# Patient Record
Sex: MALE | URBAN - METROPOLITAN AREA
[De-identification: names, ages, dates, MRNs, and addresses within clinical notes are randomized per-mention and may not be internally consistent; named-entity substitution may affect disease eponyms.]

---

## 2017-09-08 PROBLEM — F60.7 DEPENDENT PERSONALITY DISORDER (HCC): Chronic | Status: ACTIVE | Noted: 2017-09-08

## 2017-09-08 PROBLEM — F33.2 SEVERE EPISODE OF RECURRENT MAJOR DEPRESSIVE DISORDER, WITHOUT PSYCHOTIC FEATURES (HCC): Status: ACTIVE | Noted: 2017-09-08

## 2017-09-09 PROBLEM — F20.0 PARANOID SCHIZOPHRENIA (HCC): Chronic | Status: ACTIVE | Noted: 2017-09-09

## 2017-09-09 PROBLEM — F20.0 PARANOID SCHIZOPHRENIA (HCC): Chronic | Status: RESOLVED | Noted: 2017-09-09 | Resolved: 2017-09-09

## 2017-09-18 ENCOUNTER — OFFICE VISIT (OUTPATIENT)
Dept: INTERNAL MEDICINE CLINIC | Age: 52
End: 2017-09-18

## 2017-09-18 VITALS
WEIGHT: 147 LBS | HEART RATE: 84 BPM | BODY MASS INDEX: 21.77 KG/M2 | SYSTOLIC BLOOD PRESSURE: 152 MMHG | DIASTOLIC BLOOD PRESSURE: 86 MMHG | HEIGHT: 69 IN

## 2017-09-18 DIAGNOSIS — Z98.1 HISTORY OF LUMBAR FUSION: ICD-10-CM

## 2017-09-18 DIAGNOSIS — Z86.19 HISTORY OF HEPATITIS C: ICD-10-CM

## 2017-09-18 DIAGNOSIS — I10 ESSENTIAL HYPERTENSION: ICD-10-CM

## 2017-09-18 DIAGNOSIS — F33.2 SEVERE EPISODE OF RECURRENT MAJOR DEPRESSIVE DISORDER, WITHOUT PSYCHOTIC FEATURES (HCC): ICD-10-CM

## 2017-09-18 DIAGNOSIS — M54.42 CHRONIC BILATERAL LOW BACK PAIN WITH LEFT-SIDED SCIATICA: ICD-10-CM

## 2017-09-18 DIAGNOSIS — Z91.51 HISTORY OF SUICIDE ATTEMPT: ICD-10-CM

## 2017-09-18 DIAGNOSIS — F19.10 DRUG ABUSE (HCC): ICD-10-CM

## 2017-09-18 DIAGNOSIS — Z12.11 COLON CANCER SCREENING: ICD-10-CM

## 2017-09-18 DIAGNOSIS — F60.7 DEPENDENT PERSONALITY DISORDER (HCC): Chronic | ICD-10-CM

## 2017-09-18 DIAGNOSIS — Z76.89 ENCOUNTER TO ESTABLISH CARE: Primary | ICD-10-CM

## 2017-09-18 DIAGNOSIS — G89.29 CHRONIC BILATERAL LOW BACK PAIN WITH LEFT-SIDED SCIATICA: ICD-10-CM

## 2017-09-18 DIAGNOSIS — S22.079A CLOSED FRACTURE OF TENTH THORACIC VERTEBRA, UNSPECIFIED FRACTURE MORPHOLOGY, INITIAL ENCOUNTER (HCC): ICD-10-CM

## 2017-09-18 PROCEDURE — 99204 OFFICE O/P NEW MOD 45 MIN: CPT | Performed by: NURSE PRACTITIONER

## 2017-09-18 RX ORDER — ALENDRONATE SODIUM 70 MG/1
70 TABLET ORAL
COMMUNITY
End: 2018-03-05

## 2017-09-18 RX ORDER — HYDROCHLOROTHIAZIDE 12.5 MG/1
12.5 TABLET ORAL DAILY
Qty: 30 TABLET | Refills: 12 | Status: SHIPPED | OUTPATIENT
Start: 2017-09-18 | End: 2017-09-18 | Stop reason: SDUPTHER

## 2017-09-19 PROBLEM — F19.10 DRUG ABUSE (HCC): Status: ACTIVE | Noted: 2017-09-19

## 2017-09-19 PROBLEM — Z98.1 HISTORY OF LUMBAR FUSION: Status: ACTIVE | Noted: 2017-09-19

## 2017-09-19 PROBLEM — S22.079A CLOSED FRACTURE OF TENTH THORACIC VERTEBRA (HCC): Status: ACTIVE | Noted: 2017-09-19

## 2017-09-19 PROBLEM — Z86.19 HISTORY OF HEPATITIS C: Status: ACTIVE | Noted: 2017-09-19

## 2017-09-19 PROBLEM — F20.0 PARANOID SCHIZOPHRENIA (HCC): Status: ACTIVE | Noted: 2017-09-19

## 2017-09-19 PROBLEM — G89.29 CHRONIC BILATERAL LOW BACK PAIN WITH LEFT-SIDED SCIATICA: Status: ACTIVE | Noted: 2017-09-19

## 2017-09-19 PROBLEM — I10 ESSENTIAL HYPERTENSION: Status: ACTIVE | Noted: 2017-09-19

## 2017-09-19 PROBLEM — Z91.51 HISTORY OF SUICIDE ATTEMPT: Status: ACTIVE | Noted: 2017-09-19

## 2017-09-19 PROBLEM — M54.42 CHRONIC BILATERAL LOW BACK PAIN WITH LEFT-SIDED SCIATICA: Status: ACTIVE | Noted: 2017-09-19

## 2017-09-19 RX ORDER — HYDROCHLOROTHIAZIDE 12.5 MG/1
12.5 TABLET ORAL DAILY
Qty: 30 TABLET | Refills: 5 | Status: SHIPPED | OUTPATIENT
Start: 2017-09-19 | End: 2017-09-21 | Stop reason: SDUPTHER

## 2017-09-19 ASSESSMENT — ENCOUNTER SYMPTOMS
SHORTNESS OF BREATH: 0
BACK PAIN: 1
GASTROINTESTINAL NEGATIVE: 1

## 2017-09-21 DIAGNOSIS — I10 ESSENTIAL HYPERTENSION: ICD-10-CM

## 2017-09-21 RX ORDER — HYDROCHLOROTHIAZIDE 12.5 MG/1
TABLET ORAL
Qty: 90 TABLET | Refills: 0 | Status: SHIPPED | OUTPATIENT
Start: 2017-09-21 | End: 2018-02-01 | Stop reason: SDUPTHER

## 2017-11-10 ENCOUNTER — OFFICE VISIT (OUTPATIENT)
Dept: PSYCHIATRY | Age: 52
End: 2017-11-10

## 2017-11-10 VITALS
HEIGHT: 69 IN | WEIGHT: 149 LBS | DIASTOLIC BLOOD PRESSURE: 90 MMHG | BODY MASS INDEX: 22.07 KG/M2 | SYSTOLIC BLOOD PRESSURE: 144 MMHG | HEART RATE: 72 BPM

## 2017-11-10 DIAGNOSIS — F33.1 MODERATE EPISODE OF RECURRENT MAJOR DEPRESSIVE DISORDER (HCC): Primary | ICD-10-CM

## 2017-11-10 PROCEDURE — 99204 OFFICE O/P NEW MOD 45 MIN: CPT | Performed by: PSYCHIATRY & NEUROLOGY

## 2017-11-10 RX ORDER — TRAZODONE HYDROCHLORIDE 150 MG/1
150 TABLET ORAL NIGHTLY
Qty: 30 TABLET | Refills: 1 | Status: SHIPPED | OUTPATIENT
Start: 2017-11-10 | End: 2017-12-04 | Stop reason: DRUGHIGH

## 2017-11-10 ASSESSMENT — PATIENT HEALTH QUESTIONNAIRE - PHQ9
5. POOR APPETITE OR OVEREATING: 2
8. MOVING OR SPEAKING SO SLOWLY THAT OTHER PEOPLE COULD HAVE NOTICED. OR THE OPPOSITE, BEING SO FIGETY OR RESTLESS THAT YOU HAVE BEEN MOVING AROUND A LOT MORE THAN USUAL: 2
10. IF YOU CHECKED OFF ANY PROBLEMS, HOW DIFFICULT HAVE THESE PROBLEMS MADE IT FOR YOU TO DO YOUR WORK, TAKE CARE OF THINGS AT HOME, OR GET ALONG WITH OTHER PEOPLE: 3
4. FEELING TIRED OR HAVING LITTLE ENERGY: 3
7. TROUBLE CONCENTRATING ON THINGS, SUCH AS READING THE NEWSPAPER OR WATCHING TELEVISION: 3
2. FEELING DOWN, DEPRESSED OR HOPELESS: 3
3. TROUBLE FALLING OR STAYING ASLEEP: 3
9. THOUGHTS THAT YOU WOULD BE BETTER OFF DEAD, OR OF HURTING YOURSELF: 2
SUM OF ALL RESPONSES TO PHQ QUESTIONS 1-9: 24
6. FEELING BAD ABOUT YOURSELF - OR THAT YOU ARE A FAILURE OR HAVE LET YOURSELF OR YOUR FAMILY DOWN: 3
SUM OF ALL RESPONSES TO PHQ9 QUESTIONS 1 & 2: 6
1. LITTLE INTEREST OR PLEASURE IN DOING THINGS: 3

## 2017-11-10 ASSESSMENT — ANXIETY QUESTIONNAIRES
6. BECOMING EASILY ANNOYED OR IRRITABLE: 3-NEARLY EVERY DAY
GAD7 TOTAL SCORE: 20
7. FEELING AFRAID AS IF SOMETHING AWFUL MIGHT HAPPEN: 3-NEARLY EVERY DAY
5. BEING SO RESTLESS THAT IT IS HARD TO SIT STILL: 2-OVER HALF THE DAYS
4. TROUBLE RELAXING: 3-NEARLY EVERY DAY
1. FEELING NERVOUS, ANXIOUS, OR ON EDGE: 3-NEARLY EVERY DAY
2. NOT BEING ABLE TO STOP OR CONTROL WORRYING: 3-NEARLY EVERY DAY
3. WORRYING TOO MUCH ABOUT DIFFERENT THINGS: 3-NEARLY EVERY DAY
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
## 2017-11-20 PROBLEM — F33.1 MODERATE EPISODE OF RECURRENT MAJOR DEPRESSIVE DISORDER (HCC): Status: ACTIVE | Noted: 2017-09-08

## 2017-12-04 ENCOUNTER — TELEPHONE (OUTPATIENT)
Dept: INTERNAL MEDICINE CLINIC | Age: 52
End: 2017-12-04

## 2017-12-04 RX ORDER — TRAZODONE HYDROCHLORIDE 300 MG/1
300 TABLET ORAL NIGHTLY
Qty: 30 TABLET | Refills: 0 | Status: SHIPPED | OUTPATIENT
Start: 2017-12-04 | End: 2018-01-19 | Stop reason: SDUPTHER

## 2017-12-08 ENCOUNTER — OFFICE VISIT (OUTPATIENT)
Dept: PSYCHIATRY | Age: 52
End: 2017-12-08

## 2017-12-08 VITALS
BODY MASS INDEX: 22.07 KG/M2 | WEIGHT: 149 LBS | HEIGHT: 69 IN | HEART RATE: 76 BPM | DIASTOLIC BLOOD PRESSURE: 88 MMHG | SYSTOLIC BLOOD PRESSURE: 138 MMHG

## 2017-12-08 DIAGNOSIS — F33.1 MODERATE EPISODE OF RECURRENT MAJOR DEPRESSIVE DISORDER (HCC): Primary | ICD-10-CM

## 2017-12-08 DIAGNOSIS — F17.200 NICOTINE USE DISORDER: ICD-10-CM

## 2017-12-08 PROCEDURE — 99214 OFFICE O/P EST MOD 30 MIN: CPT | Performed by: PSYCHIATRY & NEUROLOGY

## 2017-12-08 RX ORDER — NICOTINE 21 MG/24HR
1 PATCH, TRANSDERMAL 24 HOURS TRANSDERMAL EVERY 24 HOURS
Qty: 30 PATCH | Refills: 1 | Status: SHIPPED | OUTPATIENT
Start: 2017-12-08 | End: 2018-01-19 | Stop reason: SDUPTHER

## 2017-12-08 NOTE — PATIENT INSTRUCTIONS
[facility] [address] [facility], [facility] [address]  [phone]

## 2017-12-12 PROBLEM — F17.200 NICOTINE USE DISORDER: Status: ACTIVE | Noted: 2017-12-12
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
## 2018-01-19 ENCOUNTER — TELEPHONE (OUTPATIENT)
Dept: RHEUMATOLOGY | Age: 53
End: 2018-01-19

## 2018-01-19 ENCOUNTER — OFFICE VISIT (OUTPATIENT)
Dept: PSYCHIATRY | Age: 53
End: 2018-01-19

## 2018-01-19 VITALS
BODY MASS INDEX: 22.51 KG/M2 | SYSTOLIC BLOOD PRESSURE: 138 MMHG | WEIGHT: 152.4 LBS | DIASTOLIC BLOOD PRESSURE: 92 MMHG | HEART RATE: 68 BPM

## 2018-01-19 DIAGNOSIS — F33.2 SEVERE EPISODE OF RECURRENT MAJOR DEPRESSIVE DISORDER, WITHOUT PSYCHOTIC FEATURES (HCC): Primary | ICD-10-CM

## 2018-01-19 DIAGNOSIS — F19.10 DRUG ABUSE (HCC): ICD-10-CM

## 2018-01-19 DIAGNOSIS — F17.200 NICOTINE USE DISORDER: ICD-10-CM

## 2018-01-19 PROCEDURE — 99215 OFFICE O/P EST HI 40 MIN: CPT | Performed by: PSYCHIATRY & NEUROLOGY

## 2018-01-19 RX ORDER — NICOTINE 21 MG/24HR
1 PATCH, TRANSDERMAL 24 HOURS TRANSDERMAL EVERY 24 HOURS
Qty: 30 PATCH | Refills: 1 | Status: SHIPPED | OUTPATIENT
Start: 2018-01-19 | End: 2018-03-05

## 2018-01-19 RX ORDER — TRAZODONE HYDROCHLORIDE 300 MG/1
300 TABLET ORAL NIGHTLY
Qty: 30 TABLET | Refills: 1 | Status: SHIPPED | OUTPATIENT
Start: 2018-01-19 | End: 2018-02-21 | Stop reason: ALTCHOICE

## 2018-01-19 NOTE — PROGRESS NOTES
PSYCHIATRY PROGRESS NOTE    Violeta Banuleos  1965 01/19/18  Face to Face time: 45 min  PCP: Radha Lawton CNP    A: 46 yo M with depression and anxiety over the last 2 yrs. He has clear evidence of substance use disorder although he has never admitted this is an issue. He has primarily been interested in benzodiazepines and narcotic pain medication but seems to be slowly understanding maybe this won't be happening and he needs to engage in other more effective treatments. He is using tramadol not prescribed to him. He is tolerating the trazodone, seems willing to try an SSRI again.      1. Major depressive disorder, moderate  2. 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 Nicotine use disorder  4. Cannabis abuse  5. Vertebral fracture, hx of lumbar fusion, HTN, hx of Hep C, chronic back pain     P:   1. Continue trazodone 300mg qhs, start sertraline 50mg daily  2. Will resume nicotine 21mg/day patch and continue efforts to quit smoking. Counseled on smoking cessation. 3. He seems to be more established with services at [facility] than he has previously expressed. He signed a VENKATESH so I will attempt to contact his   Sanjay Rivera) there. 4. Discussed safety planning including voluntary hospitalization, emergency room and other emergency services through [facility], partial hospitalization programs. 5. Suggested he f/u with PCP regarding reflux issues. I spent >50% of the appointment on counseling, education, and safety planning.      Follow-up: RTC was recommended in 2 weeks, he requested 4 weeks. Will see back in 4 weeks. Safety: RF include depression, prior self harm, chronic medical issues, male, , substance abuse. Pt is high risk for future dangerousness however at this time he does not represent an imminent risk of harm to himself or others and is safe for continued outpatient care. Pt to continue outpt treatment, he plans to access emergency services if he feels his SI worsens. but he doesn't think it is d/t being off of this. Feel the trazodone upsets his stomach and causes reflux, however he has had reflux issues even prior to this. He has not followed up with his PCP to discuss this. He did find it helpful for helping him sleep, helping him calm down a bit. Requesting something to \"take the edge off\". Still smoking MJ daily. Appears he still uses tramadol illicitly at times when he can get it, he ran out about 1 week ago, was using 400-500mg daily. He is less fixated on opiates today, but still ruminates on how he was the most stable when he was on them and doesn't understand why nobody will give them to him. Denies cocaine use. ROS: no headaches, vision problems, dysuria, abd pain, chest pain or SOB. Some acid reflux issues. Brief Medical Hx:   Vertebral fracture, hx of lumbar fusion, HTN, hx of Hep C, chronic back pain    Brief Psych Hx:  Hosp: Hospitalized 3 times in [place] in the last 2 years, and once at [address] [place] [address],[address]. 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 Lexapro   Outpt: Had a psychiatrist in [place] that she saw for short period of time.  Seeing a Therapist at [facility] but does not know the name.    Suicide Attempts: 1 suicide attempt by overdose in June 2017 after an argument with his ex-wife.  Required ICU admission and was on a ventilator.      Current Outpatient Prescriptions   Medication Sig Dispense Refill    sertraline (ZOLOFT) 50 MG tablet Take 1 tablet by mouth daily 30 tablet 1    traZODone (DESYREL) 300 MG tablet Take 1 tablet by mouth nightly 30 tablet 1    nicotine (NICODERM CQ) 21 MG/24HR Place 1 patch onto the skin every 24 hours 30 patch 1    nicotine polacrilex (NICORETTE) 2 MG gum Take 1 each by mouth every 3 hours as needed for Smoking cessation 110 each 1    hydrochlorothiazide (HYDRODIURIL) 12.5 MG tablet TAKE ONE TABLET BY

## 2018-01-19 NOTE — TELEPHONE ENCOUNTER
Patient calling stating that [facility] is saying that they did not receive the refill for the Trazodone. They did receive the Zoloft. It does say in the med list that it was received by [facility] but they are telling him that they didn't.      Please advise [phone]

## 2018-02-01 ENCOUNTER — OFFICE VISIT (OUTPATIENT)
Dept: INTERNAL MEDICINE CLINIC | Age: 53
End: 2018-02-01

## 2018-02-01 ENCOUNTER — TELEPHONE (OUTPATIENT)
Dept: INTERNAL MEDICINE CLINIC | Age: 53
End: 2018-02-01

## 2018-02-01 VITALS
SYSTOLIC BLOOD PRESSURE: 136 MMHG | HEIGHT: 69 IN | BODY MASS INDEX: 22.81 KG/M2 | HEART RATE: 80 BPM | DIASTOLIC BLOOD PRESSURE: 88 MMHG | WEIGHT: 154 LBS

## 2018-02-01 DIAGNOSIS — Z12.11 COLON CANCER SCREENING: ICD-10-CM

## 2018-02-01 DIAGNOSIS — I10 ESSENTIAL HYPERTENSION: Primary | ICD-10-CM

## 2018-02-01 DIAGNOSIS — G89.29 CHRONIC BILATERAL LOW BACK PAIN WITH LEFT-SIDED SCIATICA: ICD-10-CM

## 2018-02-01 DIAGNOSIS — R13.10 DYSPHAGIA, UNSPECIFIED TYPE: ICD-10-CM

## 2018-02-01 DIAGNOSIS — K21.00 GASTROESOPHAGEAL REFLUX DISEASE WITH ESOPHAGITIS: ICD-10-CM

## 2018-02-01 DIAGNOSIS — M54.42 CHRONIC BILATERAL LOW BACK PAIN WITH LEFT-SIDED SCIATICA: ICD-10-CM

## 2018-02-01 DIAGNOSIS — Z98.1 HISTORY OF LUMBAR FUSION: ICD-10-CM

## 2018-02-01 DIAGNOSIS — F33.2 SEVERE EPISODE OF RECURRENT MAJOR DEPRESSIVE DISORDER, WITHOUT PSYCHOTIC FEATURES (HCC): ICD-10-CM

## 2018-02-01 LAB
ALBUMIN SERPL-MCNC: 4.8 G/DL (ref 3.4–5)
ANION GAP SERPL CALCULATED.3IONS-SCNC: 15 MMOL/L (ref 3–16)
BUN BLDV-MCNC: 13 MG/DL (ref 7–20)
CALCIUM SERPL-MCNC: 9.8 MG/DL (ref 8.3–10.6)
CHLORIDE BLD-SCNC: 100 MMOL/L (ref 99–110)
CO2: 26 MMOL/L (ref 21–32)
CREAT SERPL-MCNC: 0.6 MG/DL (ref 0.9–1.3)
GFR AFRICAN AMERICAN: >60
GFR NON-AFRICAN AMERICAN: >60
GLUCOSE BLD-MCNC: 86 MG/DL (ref 70–99)
PHOSPHORUS: 3.3 MG/DL (ref 2.5–4.9)
POTASSIUM SERPL-SCNC: 4 MMOL/L (ref 3.5–5.1)
SODIUM BLD-SCNC: 141 MMOL/L (ref 136–145)

## 2018-02-01 PROCEDURE — 99214 OFFICE O/P EST MOD 30 MIN: CPT | Performed by: NURSE PRACTITIONER

## 2018-02-01 RX ORDER — RANITIDINE 300 MG/1
300 TABLET ORAL NIGHTLY
Qty: 30 TABLET | Refills: 3 | Status: SHIPPED | OUTPATIENT
Start: 2018-02-01

## 2018-02-01 RX ORDER — HYDROCHLOROTHIAZIDE 25 MG/1
25 TABLET ORAL DAILY
Qty: 90 TABLET | Refills: 1 | Status: SHIPPED | OUTPATIENT
Start: 2018-02-01

## 2018-02-01 NOTE — PROGRESS NOTES
have high blood pressure on his initial visit. He was started on hydrochlorothiazide 12.5 mg which he reports he has been taking as directed. His blood pressure remains mildly elevated. He did not get his follow-up labs drawn as ordered. Shortness of breath  Headache      Past Medical History:   Diagnosis Date    Chronic pain     Fracture of thoracic spine without spinal cord lesion (HCC)     KRISTIN (generalized anxiety disorder)     History of hepatitis C 9/19/2017    Rib fracture     T11 vertebral fracture (HCC)     Vitamin D deficiency        Current Outpatient Prescriptions   Medication Sig Dispense Refill    hydrochlorothiazide (HYDRODIURIL) 25 MG tablet Take 1 tablet by mouth daily 90 tablet 1    ranitidine (ZANTAC) 300 MG tablet Take 1 tablet by mouth nightly 30 tablet 3    sertraline (ZOLOFT) 50 MG tablet Take 1 tablet by mouth daily 30 tablet 1    traZODone (DESYREL) 300 MG tablet Take 1 tablet by mouth nightly 30 tablet 1    nicotine (NICODERM CQ) 21 MG/24HR Place 1 patch onto the skin every 24 hours 30 patch 1    nicotine polacrilex (NICORETTE) 2 MG gum Take 1 each by mouth every 3 hours as needed for Smoking cessation 110 each 1    alendronate (FOSAMAX) 70 MG tablet Take 70 mg by mouth       No current facility-administered medications for this visit. Review of Systems   Constitutional: Positive for fatigue. HENT: Positive for trouble swallowing. Eyes: Positive for visual disturbance. Respiratory: Positive for shortness of breath. Gastrointestinal:        GERD, trouble swallowing   Musculoskeletal: Positive for back pain. Neurological: Positive for weakness and headaches. Psychiatric/Behavioral: Positive for dysphoric mood and sleep disturbance. The patient is nervous/anxious. Objective:   Physical Exam   Constitutional: He is oriented to person, place, and time. He appears well-developed and well-nourished. No distress.    HENT:   Head: Normocephalic

## 2018-02-02 PROBLEM — F33.2 SEVERE EPISODE OF RECURRENT MAJOR DEPRESSIVE DISORDER, WITHOUT PSYCHOTIC FEATURES (HCC): Status: ACTIVE | Noted: 2018-02-02

## 2018-02-02 ASSESSMENT — ENCOUNTER SYMPTOMS
SHORTNESS OF BREATH: 1
BACK PAIN: 1
TROUBLE SWALLOWING: 1

## 2018-02-16 ENCOUNTER — TELEPHONE (OUTPATIENT)
Dept: PSYCHIATRY | Age: 53
End: 2018-02-16

## 2018-02-21 ENCOUNTER — OFFICE VISIT (OUTPATIENT)
Dept: PSYCHIATRY | Age: 53
End: 2018-02-21

## 2018-02-21 VITALS
DIASTOLIC BLOOD PRESSURE: 88 MMHG | SYSTOLIC BLOOD PRESSURE: 140 MMHG | OXYGEN SATURATION: 98 % | WEIGHT: 153.9 LBS | HEART RATE: 83 BPM | HEIGHT: 68 IN | BODY MASS INDEX: 23.33 KG/M2

## 2018-02-21 DIAGNOSIS — F33.1 MODERATE EPISODE OF RECURRENT MAJOR DEPRESSIVE DISORDER (HCC): Primary | ICD-10-CM

## 2018-02-21 DIAGNOSIS — F17.200 NICOTINE USE DISORDER: ICD-10-CM

## 2018-02-21 PROCEDURE — 99214 OFFICE O/P EST MOD 30 MIN: CPT | Performed by: PSYCHIATRY & NEUROLOGY

## 2018-02-21 RX ORDER — QUETIAPINE FUMARATE 25 MG/1
25 TABLET, FILM COATED ORAL NIGHTLY PRN
Qty: 30 TABLET | Refills: 0 | Status: SHIPPED | OUTPATIENT
Start: 2018-02-21 | End: 2018-03-05

## 2018-02-21 RX ORDER — SERTRALINE HYDROCHLORIDE 100 MG/1
100 TABLET, FILM COATED ORAL DAILY
Qty: 30 TABLET | Refills: 1 | Status: SHIPPED | OUTPATIENT
Start: 2018-02-21 | End: 2018-03-05

## 2018-02-21 ASSESSMENT — ANXIETY QUESTIONNAIRES
2. NOT BEING ABLE TO STOP OR CONTROL WORRYING: 2-OVER HALF THE DAYS
7. FEELING AFRAID AS IF SOMETHING AWFUL MIGHT HAPPEN: 1-SEVERAL DAYS
4. TROUBLE RELAXING: 2-OVER HALF THE DAYS
GAD7 TOTAL SCORE: 13
6. BECOMING EASILY ANNOYED OR IRRITABLE: 2-OVER HALF THE DAYS
5. BEING SO RESTLESS THAT IT IS HARD TO SIT STILL: 1-SEVERAL DAYS
3. WORRYING TOO MUCH ABOUT DIFFERENT THINGS: 2-OVER HALF THE DAYS
1. FEELING NERVOUS, ANXIOUS, OR ON EDGE: 3-NEARLY EVERY DAY

## 2018-02-21 ASSESSMENT — PATIENT HEALTH QUESTIONNAIRE - PHQ9
SUM OF ALL RESPONSES TO PHQ QUESTIONS 1-9: 12
6. FEELING BAD ABOUT YOURSELF - OR THAT YOU ARE A FAILURE OR HAVE LET YOURSELF OR YOUR FAMILY DOWN: 1
1. LITTLE INTEREST OR PLEASURE IN DOING THINGS: 1
8. MOVING OR SPEAKING SO SLOWLY THAT OTHER PEOPLE COULD HAVE NOTICED. OR THE OPPOSITE, BEING SO FIGETY OR RESTLESS THAT YOU HAVE BEEN MOVING AROUND A LOT MORE THAN USUAL: 1
10. IF YOU CHECKED OFF ANY PROBLEMS, HOW DIFFICULT HAVE THESE PROBLEMS MADE IT FOR YOU TO DO YOUR WORK, TAKE CARE OF THINGS AT HOME, OR GET ALONG WITH OTHER PEOPLE: 2
9. THOUGHTS THAT YOU WOULD BE BETTER OFF DEAD, OR OF HURTING YOURSELF: 0
7. TROUBLE CONCENTRATING ON THINGS, SUCH AS READING THE NEWSPAPER OR WATCHING TELEVISION: 1
4. FEELING TIRED OR HAVING LITTLE ENERGY: 3
2. FEELING DOWN, DEPRESSED OR HOPELESS: 1
3. TROUBLE FALLING OR STAYING ASLEEP: 1
SUM OF ALL RESPONSES TO PHQ9 QUESTIONS 1 & 2: 2
5. POOR APPETITE OR OVEREATING: 3

## 2018-02-21 NOTE — PROGRESS NOTES
PSYCHIATRY PROGRESS NOTE    Mane Tom  1965 02/21/18  Face to Face time: 25 min  PCP: Charmaine Scherer CNP    A: 46 yo M with depression and anxiety over the last 2 yrs. He looks remarkably better today and seems to be having a nice response to sertraline. In addition, he is not viewing controlled substances as a means to manage his issues and we were able to have a zaina discussion about how these may have been harmful for his mood in the past. He is future oriented about engaging in appropriate treatment and maintaining relationships with his providers. Still with a lot of anxiety and residual depressive symptoms. I don't feel strongly about the past dx of dependent PD so I will remove that from his chart.      1. Major depressive disorder, moderate, with anxious distress  2. Nicotine use disorder  3. Cannabis abuse  4. Vertebral fracture, hx of lumbar fusion, HTN, hx of Hep C, chronic back pain     P:   1. Increase sertraline to 100mg daily. 2. Ok to stay off the trazodone. 3. Will add quetiapine 25mg qhs prn for sleep. Discussed r/b/se. Plan for just 1 month as I anticipate higher sertraline dose will start to improve anxiety/sleep on its own. 4. Will hold on an NRT or other smoking cessation mgmt until pt is more motivated for tx  5. I called his CM at [facility] but she never returned my call. He may no longer be a client there as he indicates its been a long time since he was in contact with anyone there. I spent >50% of the appointment on counseling about medication, treatment, anticipatory guidance, supportive psychotherapy.      Follow-up: RTC in 6 weeks. I discussed that after our next visit he will need to establish with another provider or continue his treatment in primary care since he is now seeing a  PCP. I provided him with the number for [facility] to establish care there.      Safety: RF include depression, prior self harm, chronic medical issues, male, , substance

## 2018-03-05 ENCOUNTER — HOSPITAL ENCOUNTER (OUTPATIENT)
Dept: ENDOSCOPY | Age: 53
Discharge: HOME | End: 2018-03-05
Attending: INTERNAL MEDICINE | Admitting: INTERNAL MEDICINE

## 2018-03-05 VITALS
BODY MASS INDEX: 22.22 KG/M2 | TEMPERATURE: 97 F | HEART RATE: 69 BPM | RESPIRATION RATE: 20 BRPM | WEIGHT: 150 LBS | DIASTOLIC BLOOD PRESSURE: 93 MMHG | OXYGEN SATURATION: 100 % | HEIGHT: 69 IN | SYSTOLIC BLOOD PRESSURE: 142 MMHG

## 2018-03-05 RX ORDER — SERTRALINE HYDROCHLORIDE 100 MG/1
100 TABLET, FILM COATED ORAL DAILY
COMMUNITY
End: 2018-04-04 | Stop reason: SDUPTHER

## 2018-03-05 NOTE — PLAN OF CARE
as ordered  [x] Tolerating clear liquids  [x] D/C IV fluids   ACTIVITY [x] Assess level of function  [x] Specified by physician  [x]Activity as tolerated [x] Position on left side [x] Position on left side and reposition patient as physician ordered [x] Gradually elevate HOB to maldonados position  [x] Position changes as patient tolerated  [x] Ambulate as pre-procedure   PATIENT / SO EDUCATION [x] Pre,Intra, Post-procedure  teaching appropriate to procedure  [x]Conscious Sedation Teaching  [x] Pain Management - instructed [x] Encourage questions  [x] Clarify any concerns [x]Assist and support patient  [x]Safety devices maintain to  prevent patient injury  [x] Observe standard precautions [x] Physician confers with the family/S.O. [x] Short visit from family in [place] area  [x] Review discharge instructions, take home medicine to family /S.O.; questions clarified  [x] Physician specific post-procedure orders  [x] S/S complications with proper F/U  [x] F/U office visits  [] Med info sheet/ copy of discharge  instruction given to pt./S.O.   OUTCOME PLANNING  DISCHARGE PLAN [x] Patient/S. O. will verbalize understanding of admission  procedure & expectations of outcome in realistic terms  [x] Patient verbalize the role of family/S. O. in plan of care  [x] Patient will have designated responsible person available for discharge.  [x] Patient will demonstrate an  understanding of the planned  procedure and its related procedures and conscious sedation [x] Patient will:  - receive services according to the standards of care  - receive standards for conscious sedation  -  remain free of injury [x] Patient will:   - have stable vital signs based on Gricelda Score  -  be pain free or tolerable, have no signs of difficulty in breathing  - no abdominal distention, severe sore throat, chest pain, bleeding  -  return to pre-procedure level of conciousness   - tolerate fluid with no N/V  - ambulate as pre-procedure ADL  - verbalize

## 2018-04-04 ENCOUNTER — OFFICE VISIT (OUTPATIENT)
Dept: PSYCHIATRY | Age: 53
End: 2018-04-04

## 2018-04-04 VITALS
WEIGHT: 155.8 LBS | HEART RATE: 68 BPM | SYSTOLIC BLOOD PRESSURE: 140 MMHG | DIASTOLIC BLOOD PRESSURE: 82 MMHG | BODY MASS INDEX: 23.01 KG/M2

## 2018-04-04 DIAGNOSIS — F12.10 CANNABIS ABUSE: ICD-10-CM

## 2018-04-04 DIAGNOSIS — F33.1 MAJOR DEPRESSIVE DISORDER, RECURRENT EPISODE, MODERATE WITH ANXIOUS DISTRESS (HCC): ICD-10-CM

## 2018-04-04 DIAGNOSIS — Z13.1 ENCOUNTER FOR SCREENING FOR DIABETES MELLITUS: ICD-10-CM

## 2018-04-04 DIAGNOSIS — Z13.220 ENCOUNTER FOR SCREENING FOR LIPID DISORDER: Primary | ICD-10-CM

## 2018-04-04 DIAGNOSIS — F17.200 NICOTINE USE DISORDER: ICD-10-CM

## 2018-04-04 PROCEDURE — 99214 OFFICE O/P EST MOD 30 MIN: CPT | Performed by: PSYCHIATRY & NEUROLOGY

## 2018-04-04 RX ORDER — GABAPENTIN 300 MG/1
300 CAPSULE ORAL
COMMUNITY
Start: 2018-03-19

## 2018-04-04 RX ORDER — PANTOPRAZOLE SODIUM 40 MG/1
40 TABLET, DELAYED RELEASE ORAL
COMMUNITY
Start: 2018-02-19

## 2018-04-04 RX ORDER — QUETIAPINE FUMARATE 50 MG/1
50 TABLET, FILM COATED ORAL NIGHTLY
Qty: 30 TABLET | Refills: 3 | Status: SHIPPED | OUTPATIENT
Start: 2018-04-04

## 2018-04-04 RX ORDER — SERTRALINE HYDROCHLORIDE 100 MG/1
100 TABLET, FILM COATED ORAL DAILY
Qty: 30 TABLET | Refills: 3 | Status: SHIPPED | OUTPATIENT
Start: 2018-04-04

## 2018-04-04 RX ORDER — AMLODIPINE BESYLATE 10 MG/1
10 TABLET ORAL
COMMUNITY
Start: 2018-03-19

## 2018-04-12 ENCOUNTER — TELEPHONE (OUTPATIENT)
Dept: INTERNAL MEDICINE CLINIC | Age: 53
End: 2018-04-12

## 2018-08-30 ENCOUNTER — TELEPHONE (OUTPATIENT)
Dept: INTERNAL MEDICINE CLINIC | Age: 53
End: 2018-08-30

## 2018-08-30 NOTE — TELEPHONE ENCOUNTER
Hernan Torres, can you call Micky and clarify what his question is? He is no longer under my care and he should request his refills from Dr. Herminia Hoang (his primary care physician). Let me know if he has issues with this.